# Patient Record
Sex: MALE | Race: BLACK OR AFRICAN AMERICAN | Employment: FULL TIME | URBAN - METROPOLITAN AREA
[De-identification: names, ages, dates, MRNs, and addresses within clinical notes are randomized per-mention and may not be internally consistent; named-entity substitution may affect disease eponyms.]

---

## 2020-02-03 ENCOUNTER — APPOINTMENT (OUTPATIENT)
Dept: CT IMAGING | Age: 60
End: 2020-02-03

## 2020-02-03 ENCOUNTER — ANESTHESIA EVENT (OUTPATIENT)
Dept: OPERATING ROOM | Age: 60
End: 2020-02-03

## 2020-02-03 ENCOUNTER — HOSPITAL ENCOUNTER (OUTPATIENT)
Age: 60
Setting detail: OBSERVATION
Discharge: HOME | End: 2020-02-03
Attending: EMERGENCY MEDICINE | Admitting: SURGERY

## 2020-02-03 ENCOUNTER — ANESTHESIA (OUTPATIENT)
Dept: OPERATING ROOM | Age: 60
End: 2020-02-03

## 2020-02-03 VITALS
DIASTOLIC BLOOD PRESSURE: 83 MMHG | RESPIRATION RATE: 18 BRPM | OXYGEN SATURATION: 95 % | WEIGHT: 235.6 LBS | HEIGHT: 74 IN | TEMPERATURE: 97.6 F | BODY MASS INDEX: 30.24 KG/M2 | HEART RATE: 76 BPM | SYSTOLIC BLOOD PRESSURE: 167 MMHG

## 2020-02-03 VITALS
SYSTOLIC BLOOD PRESSURE: 162 MMHG | RESPIRATION RATE: 8 BRPM | TEMPERATURE: 96.3 F | OXYGEN SATURATION: 100 % | DIASTOLIC BLOOD PRESSURE: 115 MMHG

## 2020-02-03 PROBLEM — K37 APPENDICITIS: Status: ACTIVE | Noted: 2020-02-03

## 2020-02-03 LAB
ABO/RH: NORMAL
ALBUMIN SERPL-MCNC: 3.8 G/DL (ref 3.5–5.2)
ALP BLD-CCNC: 60 U/L (ref 40–129)
ALT SERPL-CCNC: 184 U/L (ref 0–40)
ANION GAP SERPL CALCULATED.3IONS-SCNC: 11 MMOL/L (ref 7–16)
ANTIBODY SCREEN: NORMAL
AST SERPL-CCNC: 96 U/L (ref 0–39)
BASOPHILS ABSOLUTE: 0.06 E9/L (ref 0–0.2)
BASOPHILS RELATIVE PERCENT: 0.6 % (ref 0–2)
BILIRUB SERPL-MCNC: 0.5 MG/DL (ref 0–1.2)
BUN BLDV-MCNC: 10 MG/DL (ref 8–23)
CALCIUM SERPL-MCNC: 8.9 MG/DL (ref 8.6–10.2)
CHLORIDE BLD-SCNC: 102 MMOL/L (ref 98–107)
CO2: 23 MMOL/L (ref 22–29)
CREAT SERPL-MCNC: 0.8 MG/DL (ref 0.7–1.2)
EKG ATRIAL RATE: 69 BPM
EKG P AXIS: 46 DEGREES
EKG P-R INTERVAL: 168 MS
EKG Q-T INTERVAL: 406 MS
EKG QRS DURATION: 74 MS
EKG QTC CALCULATION (BAZETT): 435 MS
EKG R AXIS: 37 DEGREES
EKG T AXIS: 52 DEGREES
EKG VENTRICULAR RATE: 69 BPM
EOSINOPHILS ABSOLUTE: 0.07 E9/L (ref 0.05–0.5)
EOSINOPHILS RELATIVE PERCENT: 0.7 % (ref 0–6)
GFR AFRICAN AMERICAN: >60
GFR NON-AFRICAN AMERICAN: >60 ML/MIN/1.73
GLUCOSE BLD-MCNC: 123 MG/DL (ref 74–99)
HCT VFR BLD CALC: 37.7 % (ref 37–54)
HEMOGLOBIN: 11.5 G/DL (ref 12.5–16.5)
IMMATURE GRANULOCYTES #: 0.04 E9/L
IMMATURE GRANULOCYTES %: 0.4 % (ref 0–5)
INR BLD: 1.2
LIPASE: 57 U/L (ref 13–60)
LYMPHOCYTES ABSOLUTE: 2.45 E9/L (ref 1.5–4)
LYMPHOCYTES RELATIVE PERCENT: 23.1 % (ref 20–42)
MCH RBC QN AUTO: 24.2 PG (ref 26–35)
MCHC RBC AUTO-ENTMCNC: 30.5 % (ref 32–34.5)
MCV RBC AUTO: 79.4 FL (ref 80–99.9)
MONOCYTES ABSOLUTE: 0.77 E9/L (ref 0.1–0.95)
MONOCYTES RELATIVE PERCENT: 7.3 % (ref 2–12)
NEUTROPHILS ABSOLUTE: 7.2 E9/L (ref 1.8–7.3)
NEUTROPHILS RELATIVE PERCENT: 67.9 % (ref 43–80)
PDW BLD-RTO: 13.7 FL (ref 11.5–15)
PLATELET # BLD: 130 E9/L (ref 130–450)
PMV BLD AUTO: 13.1 FL (ref 7–12)
POTASSIUM REFLEX MAGNESIUM: 3.8 MMOL/L (ref 3.5–5)
PROTHROMBIN TIME: 13.3 SEC (ref 9.3–12.4)
RBC # BLD: 4.75 E12/L (ref 3.8–5.8)
SODIUM BLD-SCNC: 136 MMOL/L (ref 132–146)
TOTAL PROTEIN: 7.4 G/DL (ref 6.4–8.3)
TROPONIN: <0.01 NG/ML (ref 0–0.03)
WBC # BLD: 10.6 E9/L (ref 4.5–11.5)

## 2020-02-03 PROCEDURE — 88304 TISSUE EXAM BY PATHOLOGIST: CPT

## 2020-02-03 PROCEDURE — G0378 HOSPITAL OBSERVATION PER HR: HCPCS

## 2020-02-03 PROCEDURE — 93005 ELECTROCARDIOGRAM TRACING: CPT | Performed by: STUDENT IN AN ORGANIZED HEALTH CARE EDUCATION/TRAINING PROGRAM

## 2020-02-03 PROCEDURE — 2580000003 HC RX 258: Performed by: NURSE ANESTHETIST, CERTIFIED REGISTERED

## 2020-02-03 PROCEDURE — 86900 BLOOD TYPING SEROLOGIC ABO: CPT

## 2020-02-03 PROCEDURE — 6360000002 HC RX W HCPCS: Performed by: STUDENT IN AN ORGANIZED HEALTH CARE EDUCATION/TRAINING PROGRAM

## 2020-02-03 PROCEDURE — 99285 EMERGENCY DEPT VISIT HI MDM: CPT

## 2020-02-03 PROCEDURE — 6370000000 HC RX 637 (ALT 250 FOR IP): Performed by: STUDENT IN AN ORGANIZED HEALTH CARE EDUCATION/TRAINING PROGRAM

## 2020-02-03 PROCEDURE — 6360000004 HC RX CONTRAST MEDICATION: Performed by: RADIOLOGY

## 2020-02-03 PROCEDURE — 93010 ELECTROCARDIOGRAM REPORT: CPT | Performed by: INTERNAL MEDICINE

## 2020-02-03 PROCEDURE — 2580000003 HC RX 258: Performed by: STUDENT IN AN ORGANIZED HEALTH CARE EDUCATION/TRAINING PROGRAM

## 2020-02-03 PROCEDURE — 85610 PROTHROMBIN TIME: CPT

## 2020-02-03 PROCEDURE — 83690 ASSAY OF LIPASE: CPT

## 2020-02-03 PROCEDURE — 85025 COMPLETE CBC W/AUTO DIFF WBC: CPT

## 2020-02-03 PROCEDURE — 3600000003 HC SURGERY LEVEL 3 BASE: Performed by: SURGERY

## 2020-02-03 PROCEDURE — 74177 CT ABD & PELVIS W/CONTRAST: CPT

## 2020-02-03 PROCEDURE — 96367 TX/PROPH/DG ADDL SEQ IV INF: CPT

## 2020-02-03 PROCEDURE — 3700000000 HC ANESTHESIA ATTENDED CARE: Performed by: SURGERY

## 2020-02-03 PROCEDURE — 2500000003 HC RX 250 WO HCPCS: Performed by: STUDENT IN AN ORGANIZED HEALTH CARE EDUCATION/TRAINING PROGRAM

## 2020-02-03 PROCEDURE — 86901 BLOOD TYPING SEROLOGIC RH(D): CPT

## 2020-02-03 PROCEDURE — 3600000013 HC SURGERY LEVEL 3 ADDTL 15MIN: Performed by: SURGERY

## 2020-02-03 PROCEDURE — 99220 PR INITIAL OBSERVATION CARE/DAY 70 MINUTES: CPT | Performed by: SURGERY

## 2020-02-03 PROCEDURE — 7100000000 HC PACU RECOVERY - FIRST 15 MIN: Performed by: SURGERY

## 2020-02-03 PROCEDURE — 80053 COMPREHEN METABOLIC PANEL: CPT

## 2020-02-03 PROCEDURE — 3700000001 HC ADD 15 MINUTES (ANESTHESIA): Performed by: SURGERY

## 2020-02-03 PROCEDURE — 44970 LAPAROSCOPY APPENDECTOMY: CPT | Performed by: SURGERY

## 2020-02-03 PROCEDURE — 86850 RBC ANTIBODY SCREEN: CPT

## 2020-02-03 PROCEDURE — 2709999900 HC NON-CHARGEABLE SUPPLY: Performed by: SURGERY

## 2020-02-03 PROCEDURE — 84484 ASSAY OF TROPONIN QUANT: CPT

## 2020-02-03 PROCEDURE — 2500000003 HC RX 250 WO HCPCS: Performed by: SURGERY

## 2020-02-03 PROCEDURE — 96365 THER/PROPH/DIAG IV INF INIT: CPT

## 2020-02-03 PROCEDURE — 7100000001 HC PACU RECOVERY - ADDTL 15 MIN: Performed by: SURGERY

## 2020-02-03 PROCEDURE — 6360000002 HC RX W HCPCS: Performed by: EMERGENCY MEDICINE

## 2020-02-03 PROCEDURE — 2500000003 HC RX 250 WO HCPCS: Performed by: NURSE ANESTHETIST, CERTIFIED REGISTERED

## 2020-02-03 PROCEDURE — 96375 TX/PRO/DX INJ NEW DRUG ADDON: CPT

## 2020-02-03 PROCEDURE — 36415 COLL VENOUS BLD VENIPUNCTURE: CPT

## 2020-02-03 PROCEDURE — 2700000000 HC OXYGEN THERAPY PER DAY

## 2020-02-03 PROCEDURE — 6360000002 HC RX W HCPCS: Performed by: NURSE ANESTHETIST, CERTIFIED REGISTERED

## 2020-02-03 PROCEDURE — 2720000010 HC SURG SUPPLY STERILE: Performed by: SURGERY

## 2020-02-03 RX ORDER — LABETALOL HYDROCHLORIDE 5 MG/ML
10 INJECTION, SOLUTION INTRAVENOUS EVERY 10 MIN PRN
Status: DISCONTINUED | OUTPATIENT
Start: 2020-02-03 | End: 2020-02-03 | Stop reason: HOSPADM

## 2020-02-03 RX ORDER — SODIUM CHLORIDE 0.9 % (FLUSH) 0.9 %
10 SYRINGE (ML) INJECTION PRN
Status: DISCONTINUED | OUTPATIENT
Start: 2020-02-03 | End: 2020-02-03 | Stop reason: HOSPADM

## 2020-02-03 RX ORDER — ROCURONIUM BROMIDE 10 MG/ML
INJECTION, SOLUTION INTRAVENOUS PRN
Status: DISCONTINUED | OUTPATIENT
Start: 2020-02-03 | End: 2020-02-03 | Stop reason: SDUPTHER

## 2020-02-03 RX ORDER — ONDANSETRON 2 MG/ML
INJECTION INTRAMUSCULAR; INTRAVENOUS PRN
Status: DISCONTINUED | OUTPATIENT
Start: 2020-02-03 | End: 2020-02-03 | Stop reason: SDUPTHER

## 2020-02-03 RX ORDER — ONDANSETRON 2 MG/ML
4 INJECTION INTRAMUSCULAR; INTRAVENOUS ONCE
Status: COMPLETED | OUTPATIENT
Start: 2020-02-03 | End: 2020-02-03

## 2020-02-03 RX ORDER — MORPHINE SULFATE 2 MG/ML
1 INJECTION, SOLUTION INTRAMUSCULAR; INTRAVENOUS EVERY 5 MIN PRN
Status: DISCONTINUED | OUTPATIENT
Start: 2020-02-03 | End: 2020-02-03 | Stop reason: HOSPADM

## 2020-02-03 RX ORDER — NEOSTIGMINE METHYLSULFATE 1 MG/ML
INJECTION, SOLUTION INTRAVENOUS PRN
Status: DISCONTINUED | OUTPATIENT
Start: 2020-02-03 | End: 2020-02-03 | Stop reason: SDUPTHER

## 2020-02-03 RX ORDER — SODIUM CHLORIDE 9 MG/ML
INJECTION, SOLUTION INTRAVENOUS CONTINUOUS PRN
Status: DISCONTINUED | OUTPATIENT
Start: 2020-02-03 | End: 2020-02-03 | Stop reason: SDUPTHER

## 2020-02-03 RX ORDER — BUPIVACAINE HYDROCHLORIDE AND EPINEPHRINE 2.5; 5 MG/ML; UG/ML
INJECTION, SOLUTION EPIDURAL; INFILTRATION; INTRACAUDAL; PERINEURAL PRN
Status: DISCONTINUED | OUTPATIENT
Start: 2020-02-03 | End: 2020-02-03 | Stop reason: HOSPADM

## 2020-02-03 RX ORDER — FENTANYL CITRATE 50 UG/ML
INJECTION, SOLUTION INTRAMUSCULAR; INTRAVENOUS PRN
Status: DISCONTINUED | OUTPATIENT
Start: 2020-02-03 | End: 2020-02-03 | Stop reason: SDUPTHER

## 2020-02-03 RX ORDER — SODIUM CHLORIDE 0.9 % (FLUSH) 0.9 %
10 SYRINGE (ML) INJECTION EVERY 12 HOURS SCHEDULED
Status: DISCONTINUED | OUTPATIENT
Start: 2020-02-03 | End: 2020-02-03 | Stop reason: HOSPADM

## 2020-02-03 RX ORDER — PROMETHAZINE HYDROCHLORIDE 25 MG/ML
12.5 INJECTION, SOLUTION INTRAMUSCULAR; INTRAVENOUS
Status: DISCONTINUED | OUTPATIENT
Start: 2020-02-03 | End: 2020-02-03 | Stop reason: HOSPADM

## 2020-02-03 RX ORDER — OXYCODONE HYDROCHLORIDE 5 MG/1
5 TABLET ORAL EVERY 4 HOURS PRN
Status: DISCONTINUED | OUTPATIENT
Start: 2020-02-03 | End: 2020-02-03 | Stop reason: HOSPADM

## 2020-02-03 RX ORDER — DEXAMETHASONE SODIUM PHOSPHATE 10 MG/ML
INJECTION INTRAMUSCULAR; INTRAVENOUS PRN
Status: DISCONTINUED | OUTPATIENT
Start: 2020-02-03 | End: 2020-02-03 | Stop reason: SDUPTHER

## 2020-02-03 RX ORDER — GLYCOPYRROLATE 1 MG/5 ML
SYRINGE (ML) INTRAVENOUS PRN
Status: DISCONTINUED | OUTPATIENT
Start: 2020-02-03 | End: 2020-02-03 | Stop reason: SDUPTHER

## 2020-02-03 RX ORDER — OXYCODONE HYDROCHLORIDE 10 MG/1
10 TABLET ORAL EVERY 4 HOURS PRN
Status: DISCONTINUED | OUTPATIENT
Start: 2020-02-03 | End: 2020-02-03 | Stop reason: HOSPADM

## 2020-02-03 RX ORDER — LIDOCAINE HYDROCHLORIDE 10 MG/ML
INJECTION, SOLUTION INFILTRATION; PERINEURAL PRN
Status: DISCONTINUED | OUTPATIENT
Start: 2020-02-03 | End: 2020-02-03 | Stop reason: SDUPTHER

## 2020-02-03 RX ORDER — MORPHINE SULFATE 4 MG/ML
4 INJECTION, SOLUTION INTRAMUSCULAR; INTRAVENOUS ONCE
Status: COMPLETED | OUTPATIENT
Start: 2020-02-03 | End: 2020-02-03

## 2020-02-03 RX ORDER — SODIUM CHLORIDE, SODIUM LACTATE, POTASSIUM CHLORIDE, CALCIUM CHLORIDE 600; 310; 30; 20 MG/100ML; MG/100ML; MG/100ML; MG/100ML
INJECTION, SOLUTION INTRAVENOUS CONTINUOUS
Status: DISCONTINUED | OUTPATIENT
Start: 2020-02-03 | End: 2020-02-03 | Stop reason: HOSPADM

## 2020-02-03 RX ORDER — FENTANYL CITRATE 50 UG/ML
50 INJECTION, SOLUTION INTRAMUSCULAR; INTRAVENOUS ONCE
Status: COMPLETED | OUTPATIENT
Start: 2020-02-03 | End: 2020-02-03

## 2020-02-03 RX ORDER — PROPOFOL 10 MG/ML
INJECTION, EMULSION INTRAVENOUS PRN
Status: DISCONTINUED | OUTPATIENT
Start: 2020-02-03 | End: 2020-02-03 | Stop reason: SDUPTHER

## 2020-02-03 RX ORDER — 0.9 % SODIUM CHLORIDE 0.9 %
500 INTRAVENOUS SOLUTION INTRAVENOUS ONCE
Status: COMPLETED | OUTPATIENT
Start: 2020-02-03 | End: 2020-02-03

## 2020-02-03 RX ORDER — OXYCODONE HYDROCHLORIDE AND ACETAMINOPHEN 5; 325 MG/1; MG/1
1 TABLET ORAL EVERY 6 HOURS PRN
Qty: 20 TABLET | Refills: 0 | Status: SHIPPED | OUTPATIENT
Start: 2020-02-03 | End: 2020-02-08

## 2020-02-03 RX ORDER — SUCCINYLCHOLINE/SOD CL,ISO/PF 200MG/10ML
SYRINGE (ML) INTRAVENOUS PRN
Status: DISCONTINUED | OUTPATIENT
Start: 2020-02-03 | End: 2020-02-03 | Stop reason: SDUPTHER

## 2020-02-03 RX ADMIN — FENTANYL CITRATE 50 MCG: 50 INJECTION, SOLUTION INTRAMUSCULAR; INTRAVENOUS at 07:23

## 2020-02-03 RX ADMIN — SODIUM CHLORIDE: 9 INJECTION, SOLUTION INTRAVENOUS at 08:15

## 2020-02-03 RX ADMIN — FENTANYL CITRATE 50 MCG: 50 INJECTION, SOLUTION INTRAMUSCULAR; INTRAVENOUS at 04:01

## 2020-02-03 RX ADMIN — SODIUM CHLORIDE 500 ML: 9 INJECTION, SOLUTION INTRAVENOUS at 02:39

## 2020-02-03 RX ADMIN — ONDANSETRON HYDROCHLORIDE 4 MG: 2 INJECTION, SOLUTION INTRAMUSCULAR; INTRAVENOUS at 07:17

## 2020-02-03 RX ADMIN — DEXAMETHASONE SODIUM PHOSPHATE 10 MG: 10 INJECTION INTRAMUSCULAR; INTRAVENOUS at 07:17

## 2020-02-03 RX ADMIN — CEFTRIAXONE SODIUM 1 G: 1 INJECTION, POWDER, FOR SOLUTION INTRAMUSCULAR; INTRAVENOUS at 04:55

## 2020-02-03 RX ADMIN — ONDANSETRON 4 MG: 2 INJECTION INTRAMUSCULAR; INTRAVENOUS at 04:01

## 2020-02-03 RX ADMIN — FENTANYL CITRATE 100 MCG: 50 INJECTION, SOLUTION INTRAMUSCULAR; INTRAVENOUS at 07:17

## 2020-02-03 RX ADMIN — SODIUM CHLORIDE, POTASSIUM CHLORIDE, SODIUM LACTATE AND CALCIUM CHLORIDE: 600; 310; 30; 20 INJECTION, SOLUTION INTRAVENOUS at 08:58

## 2020-02-03 RX ADMIN — FENTANYL CITRATE 50 MCG: 50 INJECTION, SOLUTION INTRAMUSCULAR; INTRAVENOUS at 07:36

## 2020-02-03 RX ADMIN — Medication 3 MG: at 08:14

## 2020-02-03 RX ADMIN — OXYCODONE HYDROCHLORIDE 10 MG: 10 TABLET ORAL at 10:42

## 2020-02-03 RX ADMIN — PROPOFOL 40 MG: 10 INJECTION, EMULSION INTRAVENOUS at 07:24

## 2020-02-03 RX ADMIN — OXYCODONE HYDROCHLORIDE 10 MG: 10 TABLET ORAL at 14:35

## 2020-02-03 RX ADMIN — METRONIDAZOLE 500 MG: 500 INJECTION, SOLUTION INTRAVENOUS at 05:31

## 2020-02-03 RX ADMIN — SODIUM CHLORIDE: 9 INJECTION, SOLUTION INTRAVENOUS at 07:06

## 2020-02-03 RX ADMIN — PROPOFOL 200 MG: 10 INJECTION, EMULSION INTRAVENOUS at 07:17

## 2020-02-03 RX ADMIN — IOPAMIDOL 110 ML: 755 INJECTION, SOLUTION INTRAVENOUS at 03:32

## 2020-02-03 RX ADMIN — ROCURONIUM BROMIDE 50 MG: 10 INJECTION, SOLUTION INTRAVENOUS at 07:24

## 2020-02-03 RX ADMIN — LIDOCAINE HYDROCHLORIDE 100 MG: 10 INJECTION, SOLUTION INFILTRATION; PERINEURAL at 07:17

## 2020-02-03 RX ADMIN — Medication 0.6 MG: at 08:14

## 2020-02-03 RX ADMIN — Medication 180 MG: at 07:17

## 2020-02-03 RX ADMIN — MORPHINE SULFATE 4 MG: 4 INJECTION, SOLUTION INTRAMUSCULAR; INTRAVENOUS at 05:28

## 2020-02-03 ASSESSMENT — ENCOUNTER SYMPTOMS
EYE DISCHARGE: 0
BACK PAIN: 0
ABDOMINAL PAIN: 1
EYE PAIN: 0
SORE THROAT: 0
VOMITING: 1
COUGH: 0
DIARRHEA: 0
BLOOD IN STOOL: 0
EYE REDNESS: 0
CONSTIPATION: 0
WHEEZING: 0
SHORTNESS OF BREATH: 0
NAUSEA: 1
SINUS PRESSURE: 0

## 2020-02-03 ASSESSMENT — PAIN DESCRIPTION - ONSET
ONSET: ON-GOING
ONSET: SUDDEN

## 2020-02-03 ASSESSMENT — PAIN DESCRIPTION - PROGRESSION
CLINICAL_PROGRESSION: GRADUALLY WORSENING
CLINICAL_PROGRESSION: NOT CHANGED

## 2020-02-03 ASSESSMENT — PULMONARY FUNCTION TESTS
PIF_VALUE: 36
PIF_VALUE: 25
PIF_VALUE: 4
PIF_VALUE: 22
PIF_VALUE: 36
PIF_VALUE: 36
PIF_VALUE: 34
PIF_VALUE: 21
PIF_VALUE: 22
PIF_VALUE: 36
PIF_VALUE: 36
PIF_VALUE: 35
PIF_VALUE: 33
PIF_VALUE: 34
PIF_VALUE: 24
PIF_VALUE: 3
PIF_VALUE: 35
PIF_VALUE: 1
PIF_VALUE: 3
PIF_VALUE: 21
PIF_VALUE: 37
PIF_VALUE: 18
PIF_VALUE: 0
PIF_VALUE: 23
PIF_VALUE: 1
PIF_VALUE: 35
PIF_VALUE: 1
PIF_VALUE: 36
PIF_VALUE: 1
PIF_VALUE: 20
PIF_VALUE: 34
PIF_VALUE: 35
PIF_VALUE: 22
PIF_VALUE: 28
PIF_VALUE: 8
PIF_VALUE: 37
PIF_VALUE: 34
PIF_VALUE: 9
PIF_VALUE: 30
PIF_VALUE: 34
PIF_VALUE: 37
PIF_VALUE: 4
PIF_VALUE: 25
PIF_VALUE: 37
PIF_VALUE: 36
PIF_VALUE: 0
PIF_VALUE: 34
PIF_VALUE: 37
PIF_VALUE: 0
PIF_VALUE: 37
PIF_VALUE: 35
PIF_VALUE: 38
PIF_VALUE: 37
PIF_VALUE: 35
PIF_VALUE: 37
PIF_VALUE: 2
PIF_VALUE: 36
PIF_VALUE: 24
PIF_VALUE: 0
PIF_VALUE: 38
PIF_VALUE: 36
PIF_VALUE: 35
PIF_VALUE: 0
PIF_VALUE: 36
PIF_VALUE: 36
PIF_VALUE: 38
PIF_VALUE: 2
PIF_VALUE: 1
PIF_VALUE: 24
PIF_VALUE: 35
PIF_VALUE: 24
PIF_VALUE: 37
PIF_VALUE: 0

## 2020-02-03 ASSESSMENT — PAIN DESCRIPTION - ORIENTATION
ORIENTATION: UPPER
ORIENTATION: UPPER

## 2020-02-03 ASSESSMENT — PAIN - FUNCTIONAL ASSESSMENT: PAIN_FUNCTIONAL_ASSESSMENT: ACTIVITIES ARE NOT PREVENTED

## 2020-02-03 ASSESSMENT — PAIN SCALES - GENERAL
PAINLEVEL_OUTOF10: 0
PAINLEVEL_OUTOF10: 10
PAINLEVEL_OUTOF10: 0
PAINLEVEL_OUTOF10: 10
PAINLEVEL_OUTOF10: 7
PAINLEVEL_OUTOF10: 7
PAINLEVEL_OUTOF10: 5
PAINLEVEL_OUTOF10: 7
PAINLEVEL_OUTOF10: 8
PAINLEVEL_OUTOF10: 8

## 2020-02-03 ASSESSMENT — PAIN DESCRIPTION - LOCATION
LOCATION: ABDOMEN
LOCATION: ABDOMEN

## 2020-02-03 ASSESSMENT — PAIN DESCRIPTION - FREQUENCY
FREQUENCY: CONTINUOUS
FREQUENCY: CONTINUOUS

## 2020-02-03 ASSESSMENT — PAIN DESCRIPTION - DESCRIPTORS
DESCRIPTORS: SHARP
DESCRIPTORS: SHARP

## 2020-02-03 ASSESSMENT — PAIN DESCRIPTION - PAIN TYPE
TYPE: ACUTE PAIN
TYPE: ACUTE PAIN

## 2020-02-03 NOTE — PROGRESS NOTES
Pt is from [place], [place] and only in this area for work.  Patient is independent and will require no needs at d/c.  Sushila Tay RN

## 2020-02-03 NOTE — OP NOTE
Operative Note    Date: 2/3/2020  Time: 8:29 AM      Mark Anthony Vizcarra is an 62 y.o. male. Pre-op Diagnosis:   APPENDICITIS    Post-op Diagnosis List:  APPENDICITIS       Procedure(s):  APPENDECTOMY LAPAROSCOPIC    Surgeon:   Phuc Thomas MD, FACS    Staff:   Circulator: Issa Lopez RN  Scrub Person First: Kathrine Ambrocio  Circulator Assist: Sarmad Celis RN  Resident: Neal Ny MD, PGY-2; Pham Garnett MD, PGY-5    Anesthesia:  General    Findings: acute appendicitis     EBL: 10 cc    Drains: None    Specimens:  ID Type Source Tests Collected by Time Destination   A : APPENDIX Tissue Appendix Melecio Griffiths MD 2/3/2020 0606          Complications:  None    Disposition of Patient:  Tolerated procedure well    CDC Wound Closure Status:  Primary    Surgical Course:  Patient is a 62 y.o. male who was diagnosed with Acute Appendicitis. Risks benefits and alternatives of the procedure were discussed with the patient. The planned procedure was agreed to and informed consent was obtained. Patient was brought to the operating room and placed supine on the table. General anesthesia was induced which the patient tolerated well. Patient received appropriate  perioperative antibiotics. Patient was prepped and draped in usual sterile fashion. Time out was performed to identify correct patient and procedure. Veress needle placed suprumbilically through 5 mm port incision. Abdominal entry was achieved via optiport entry visualizing the abdominal wall layer by layer. Suprapubic and left lower quadrant incisions were made and 5 and 12 mm port placed respectively under direct visualization. No injury on entry was noted. Blunt graspers were used to sweep the bowel out of the right lower quadrant. Dissection was used to identify the appendix at the base of the cecum where the tenia converged. It appeared acute inflamed with surrounding edema.   The appendix was attached to the lateral wall with adhesions which were taken down with the Enseal device. The the appendix was identified and at this time, and followed back to the base of the appendix which was grasped and elevated. A mesenteric window was made. Blue load stapler Endo TONY 45mm was placed across the base of the appendix and fired. Mesentery was ligated with Enseal. Hemostasis was ensured and staple line appeared intact. LLQ fascia closed with absorbable suture with yovany raymond device. Port sites closed with subcuticular absorbable suture. Needle, instrument, sponge count were correct x2. Dr. Sheron Gallardo was present for the entire case. patient tolerated the procedure well and transferred to PACU in stable condition.     Fátima Allen MD  02/03/20  8:29 AM

## 2020-02-03 NOTE — H&P
GENERAL SURGERY  HISTORY AND PHYSICAL             Date: 2/3/2020        Patient Name: Dameon Pruett     YOB: 1960      Age:  62 y.o. Chief Complaint     Chief Complaint   Patient presents with    Abdominal Pain     Upper abd pain throughout the day, but worse after eating at 4 pm.  Pt took Pepto and vomited twice      History Obtained From   patient    History of Present Illness   Dameon Pruett is a 62 y.o. male with hepatitis C who presents for nausea, vomiting, and cramping intermittent abdominal pain starting yesterday after returning home from work. It is all across his mid abdomen and has not changed in location nor severity. Never had anything like this before. Emesis with food, beer, pepto bismol. Never had symptoms like this before. Denies history of GERD or ulcers. Never had EGD/cscope before. Had a normal BM yesterday, no melena/blood, felt bloated/constipated. Past Medical History     Past Medical History:   Diagnosis Date    Hepatitis C         Past Surgical History   History reviewed. No pertinent surgical history. Medications Prior to Admission     Prior to Admission medications    Medication Sig Start Date End Date Taking? Authorizing Provider   clobetasol (TEMOVATE) 0.05 % ointment Apply topically 2 times daily. 7/19/13   Barry James MD        Allergies   Patient has no known allergies. Social History     Social History     Tobacco History     Smoking Status  Current Every Day Smoker Smoking Frequency  0.2 packs/day    Smokeless Tobacco Use  Never Used          Alcohol History     Alcohol Use Status  Yes          Drug Use     Drug Use Status  No          Sexual Activity     Sexually Active  Not Asked            ~3 beers per day, 2 cigarettes per day. Family History   History reviewed. No pertinent family history.     Review of Systems   Pertinent ROS listed in HPI, all others negative    Physical Exam   BP (!) 144/83   Pulse 71   Temp 96.6 °F (35.9 °C) (Temporal)   Resp 18   Ht 6' 2\" (1.88 m)   Wt 245 lb (111.1 kg)   SpO2 99%   BMI 31.46 kg/m²     GENERAL:  No acute distress. Alert and oriented. HEAD:  Normocephalic, atraumatic. EYES:  No scleral icterus. Conjugate gaze. ENT/NECK:  Trachea midline, mucous membranes moist.   LUNGS:  No cough. Nonlabored breathing on room air. CARDIOVASC:  Normal rate, no cyanosis, regular rhythm. ABDOMEN:  Soft, non-distended, focally-tender in RLQ with mild guarding. No other guarding / rigidity / rebound. LIMBS:  No deformities, no edema. NEURO:  Face symmetric, moves all extremities  SKIN:  Warm, dry. Labs    CBC  Recent Labs     02/03/20 0217   WBC 10.6   HGB 11.5*   HCT 37.7        BMP  Recent Labs     02/03/20 0217      K 3.8      CO2 23   BUN 10   CREATININE 0.8   CALCIUM 8.9     Liver Function  Recent Labs     02/03/20 0217   LIPASE 57   BILITOT 0.5   AST 96*   *   ALKPHOS 60   PROT 7.4   LABALBU 3.8     No results for input(s): LACTATE in the last 72 hours. No results for input(s): INR, PTT in the last 72 hours. Invalid input(s): PT    Imaging/Diagnostics Last 24 Hours   Ct Abdomen Pelvis W Iv Contrast Additional Contrast? None    Addendum Date: 2/3/2020    **THIS REPORT CONTAINS FINDINGS WHICH MAY BE CRITICAL TO PATIENT CARE. ** -Using a conventional landline telephone device, the critical findings of this report were read aloud by  to Dr. Darcy Ayala at 4:38 AM EST on 2/3/2020, who then verbally acknowledged the findings described within report.  This addendum has been electronically signed by Bear Lambert MD.    Result Date: 2/3/2020  PROCEDURE INFORMATION: Exam: CT Abdomen And Pelvis With Contrast Exam date and time: 2/3/2020 2:30 AM Age: 62years old Clinical indication: Abdominal pain; Generalized TECHNIQUE: Imaging protocol: Computed tomography of the abdomen and pelvis with intravenous contrast. Radiation optimization: All CT scans at this facility use at male with appendicitis    Plan   - laparoscopic possible open appendectomy    Plan will be discussed with Dr. Demetra Rutledge    Electronically signed by Susie Trinidad MD on 2/3/20 at 5:13 AM

## 2020-02-03 NOTE — ANESTHESIA POSTPROCEDURE EVALUATION
Department of Anesthesiology  Postprocedure Note    Patient: Magalie Stephens  MRN: [id number]  YOB: 1960  Date of evaluation: 2/3/2020  Time:  8:59 AM     Procedure Summary     Date:  02/03/20 Room / Location:  [facility] OR 12 / [facility]    Anesthesia Start:  1590 Anesthesia Stop:  8794    Procedure:  APPENDECTOMY LAPAROSCOPIC (N/A Abdomen) Diagnosis:  (APPENDICITIS)    Surgeon:  Shahriar Wasserman MD Responsible Provider:  Dong Langford MD    Anesthesia Type:  general ASA Status:  3          Anesthesia Type: general    Radha Phase I: Radha Score: 8    Radha Phase II:      Last vitals: Reviewed and per EMR flowsheets.        Anesthesia Post Evaluation    Patient location during evaluation: PACU  Patient participation: complete - patient participated  Level of consciousness: awake  Pain score: 0  Airway patency: patent  Nausea & Vomiting: no nausea and no vomiting  Complications: no  Cardiovascular status: hemodynamically stable  Respiratory status: acceptable  Hydration status: euvolemic

## 2020-02-03 NOTE — ANESTHESIA PRE PROCEDURE
debris/appendicoliths    noted. Intraperitoneal space: No pneumoperitoneum. No free intraperitoneal fluid. Vasculature: No aortic aneurysm. Atherosclerotic arterial calcifications    present. Right pelvic phlebolith. Lymph nodes: Shotty nonspecific retroperitoneal and bilateral inguinal lymph    nodes present. Bladder: Unremarkable distended urinary bladder. Reproductive: Unremarkable as visualized. Bones/joints: No acute displaced fracture or osseous neoplastic lesion. Mild    multilevel degenerative changes present within the spine. Soft tissues: Unremarkable.            Impression   1. Acute appendicitis. 2. Remainder of findings described as above.        This report has been electronically signed by Qamar Coello MD.             Anesthesia Evaluation  Patient summary reviewed and Nursing notes reviewed no history of anesthetic complications:   Airway: Mallampati: IV  TM distance: >3 FB   Neck ROM: full  Mouth opening: > = 3 FB Dental:          Pulmonary:Negative Pulmonary ROS and normal exam                               Cardiovascular:  Exercise tolerance: good (>4 METS),           Rhythm: regular  Rate: normal                    Neuro/Psych:   Negative Neuro/Psych ROS              GI/Hepatic/Renal:   (+) hepatitis: C, liver disease:,          ROS comment: Nausea/vomiting . Endo/Other: Negative Endo/Other ROS                    Abdominal:           Vascular: negative vascular ROS. Anesthesia Plan      general     ASA 3     (#18 Left AC )  Induction: rapid sequence and intravenous. MIPS: Postoperative opioids intended, Prophylactic antiemetics administered and Postoperative trial extubation. Anesthetic plan and risks discussed with patient. Use of blood products discussed with patient whom consented to blood products. Plan discussed with attending.                 Luna Pantoja RN   2/3/2020

## 2020-02-03 NOTE — ED PROVIDER NOTES
Chief Complaint   Patient presents with    Abdominal Pain     Upper abd pain throughout the day, but worse after eating at 4 pm.  Pt took Pepto and vomited twice       Amy Dawkins is a 26-year-old male with past medical history of hepatitis C, who presents today with abdominal pain. Patient states symptoms started around 5:00 today is generalized abdominal pain, and is continued to persist throughout the evening. Patient states he tried Pepto-Bismol at home, with minimal relief. He states he had nausea with multiple episodes of vomiting. He states he had not had pain like this before in the past.  Pain is worse with movement, and improved with rest.  Patient denies previous abdominal surgeries in the past.  Denies changes in stools, had a normal bowel movement this morning. Review of Systems   Constitutional: Negative for chills and fever. HENT: Negative for ear pain, sinus pressure and sore throat. Eyes: Negative for pain, discharge and redness. Respiratory: Negative for cough, shortness of breath and wheezing. Cardiovascular: Negative for chest pain, palpitations and leg swelling. Gastrointestinal: Positive for abdominal pain, nausea and vomiting. Negative for blood in stool, constipation and diarrhea. Genitourinary: Negative for dysuria and frequency. Musculoskeletal: Negative for arthralgias and back pain. Skin: Negative for rash and wound. Neurological: Negative for weakness and headaches. Hematological: Negative for adenopathy. Psychiatric/Behavioral: Negative for behavioral problems and confusion. All other systems reviewed and are negative. Physical Exam  Vitals signs and nursing note reviewed. Constitutional:       Appearance: Normal appearance. He is well-developed. He is obese. He is not toxic-appearing or diaphoretic. HENT:      Head: Normocephalic and atraumatic.       Nose: Nose normal.      Mouth/Throat:      Mouth: Mucous membranes are moist.   Eyes: Pupils: Pupils are equal, round, and reactive to light. Neck:      Musculoskeletal: Normal range of motion and neck supple. Cardiovascular:      Rate and Rhythm: Normal rate and regular rhythm. Pulses: Normal pulses. Heart sounds: Normal heart sounds. No murmur. Pulmonary:      Effort: Pulmonary effort is normal. No respiratory distress. Breath sounds: Normal breath sounds. No wheezing or rales. Abdominal:      General: Bowel sounds are normal. There is no distension. Palpations: Abdomen is soft. Tenderness: There is abdominal tenderness. There is no right CVA tenderness, left CVA tenderness, guarding or rebound. Comments: Generalized abdominal tenderness, mildly worse in the right lower quadrant with palpation   Musculoskeletal: Normal range of motion. Right lower leg: No edema. Left lower leg: No edema. Skin:     General: Skin is warm and dry. Capillary Refill: Capillary refill takes less than 2 seconds. Findings: No erythema, lesion or rash. Neurological:      General: No focal deficit present. Mental Status: He is alert and oriented to person, place, and time. Cranial Nerves: No cranial nerve deficit.       Coordination: Coordination normal.   Psychiatric:         Mood and Affect: Mood normal.         Behavior: Behavior normal.          Procedures     Labs Reviewed   CBC WITH AUTO DIFFERENTIAL - Abnormal; Notable for the following components:       Result Value    Hemoglobin 11.5 (*)     MCV 79.4 (*)     MCH 24.2 (*)     MCHC 30.5 (*)     MPV 13.1 (*)     All other components within normal limits   COMPREHENSIVE METABOLIC PANEL W/ REFLEX TO MG FOR LOW K - Abnormal; Notable for the following components:    Glucose 123 (*)      (*)     AST 96 (*)     All other components within normal limits   LIPASE   TROPONIN     CT ABDOMEN PELVIS W IV CONTRAST Additional Contrast? None   Final Result   Addendum 1 of 1   **THIS REPORT CONTAINS FINDINGS allergies.     -------------------------------------------------- RESULTS -------------------------------------------------    LABS:  Results for orders placed or performed during the hospital encounter of 02/03/20   CBC Auto Differential   Result Value Ref Range    WBC 10.6 4.5 - 11.5 E9/L    RBC 4.75 3.80 - 5.80 E12/L    Hemoglobin 11.5 (L) 12.5 - 16.5 g/dL    Hematocrit 37.7 37.0 - 54.0 %    MCV 79.4 (L) 80.0 - 99.9 fL    MCH 24.2 (L) 26.0 - 35.0 pg    MCHC 30.5 (L) 32.0 - 34.5 %    RDW 13.7 11.5 - 15.0 fL    Platelets 004 730 - 756 E9/L    MPV 13.1 (H) 7.0 - 12.0 fL    Neutrophils % 67.9 43.0 - 80.0 %    Immature Granulocytes % 0.4 0.0 - 5.0 %    Lymphocytes % 23.1 20.0 - 42.0 %    Monocytes % 7.3 2.0 - 12.0 %    Eosinophils % 0.7 0.0 - 6.0 %    Basophils % 0.6 0.0 - 2.0 %    Neutrophils Absolute 7.20 1.80 - 7.30 E9/L    Immature Granulocytes # 0.04 E9/L    Lymphocytes Absolute 2.45 1.50 - 4.00 E9/L    Monocytes Absolute 0.77 0.10 - 0.95 E9/L    Eosinophils Absolute 0.07 0.05 - 0.50 E9/L    Basophils Absolute 0.06 0.00 - 0.20 E9/L   Comprehensive Metabolic Panel w/ Reflex to MG   Result Value Ref Range    Sodium 136 132 - 146 mmol/L    Potassium reflex Magnesium 3.8 3.5 - 5.0 mmol/L    Chloride 102 98 - 107 mmol/L    CO2 23 22 - 29 mmol/L    Anion Gap 11 7 - 16 mmol/L    Glucose 123 (H) 74 - 99 mg/dL    BUN 10 8 - 23 mg/dL    CREATININE 0.8 0.7 - 1.2 mg/dL    GFR Non-African American >60 >=60 mL/min/1.73    GFR African American >60     Calcium 8.9 8.6 - 10.2 mg/dL    Total Protein 7.4 6.4 - 8.3 g/dL    Alb 3.8 3.5 - 5.2 g/dL    Total Bilirubin 0.5 0.0 - 1.2 mg/dL    Alkaline Phosphatase 60 40 - 129 U/L     (H) 0 - 40 U/L    AST 96 (H) 0 - 39 U/L   Lipase   Result Value Ref Range    Lipase 57 13 - 60 U/L   Troponin   Result Value Ref Range    Troponin <0.01 0.00 - 0.03 ng/mL       RADIOLOGY:  CT ABDOMEN PELVIS W IV CONTRAST Additional Contrast? None   Final Result   Addendum 1 of 1   **THIS REPORT CONTAINS Acute appendicitis with generalized peritonitis, without gangrene or abscess, unspecified whether perforation present        Disposition:  Patient's disposition: Admit to med/surg floor  Patient's condition is fair.             Anoop Ngo DO  Resident  02/03/20 0341

## 2020-02-03 NOTE — BRIEF OP NOTE
Brief Postoperative Note  ______________________________________________________________    Patient: Anastasia Camp  YOB: 1960  MRN: [id number]  Date of Procedure: 2/3/2020    Pre-Op Diagnosis: APPENDICITIS    Post-Op Diagnosis: Same       Procedure(s):  APPENDECTOMY LAPAROSCOPIC    Anesthesia: General    Surgeon(s):  MD Jacob Zavaleta MD    Assistant: Ubaldo Leon MD, PGY-5  Nabor Rincon MD, PhD, PGY-2    Estimated Blood Loss (mL): 10 cc    Complications: None    Specimens:   ID Type Source Tests Collected by Time Destination   A : APPENDIX Tissue Appendix SURGICAL PATHOLOGY Jessica Guerrero MD 2/3/2020 3104        Implants:  * No implants in log *      Drains:   Urethral Catheter Straight-tip 16 fr (Active)       Findings: acute appendicitis     Nabor Rincon MD  Date: 2/3/2020  Time: 8:27 AM

## (undated) DEVICE — SYRINGE 20ML LL S/C 50

## (undated) DEVICE — INSUFFLATION TUBING SET WITH FILTER, FUNNEL CONNECTOR AND LUER LOCK: Brand: JOSNOE MEDICAL INC

## (undated) DEVICE — TROCAR: Brand: KII FIOS FIRST ENTRY

## (undated) DEVICE — RELOAD STPL SZ 0 L45MM DIA3.5MM 0DEG STD REG TISS BLU TI

## (undated) DEVICE — PATIENT RETURN ELECTRODE, SINGLE-USE, CONTACT QUALITY MONITORING, ADULT, WITH 9FT CORD, FOR PATIENTS WEIGING OVER 33LBS. (15KG): Brand: MEGADYNE

## (undated) DEVICE — SURGICAL PROCEDURE PACK BASIC

## (undated) DEVICE — NEEDLE HYPO 25GA L1.5IN BLU POLYPR HUB S STL REG BVL STR

## (undated) DEVICE — CUTTER ENDOSCP L340MM LIN ARTC SGL STROKE FIRING ENDOPATH

## (undated) DEVICE — GOWN,SIRUS,FABRNF,XL,20/CS: Brand: MEDLINE

## (undated) DEVICE — NEEDLE CLOSURE OMNICLOSE

## (undated) DEVICE — STERILE POLYISOPRENE POWDER-FREE SURGICAL GLOVES: Brand: PROTEXIS

## (undated) DEVICE — LAPAROSCOPIC SCISSORS: Brand: EPIX LAPAROSCOPIC SCISSORS

## (undated) DEVICE — CHLORAPREP 26ML ORANGE

## (undated) DEVICE — GARMENT,MEDLINE,DVT,INT,CALF,MED, GEN2: Brand: MEDLINE

## (undated) DEVICE — INSUFFLATION NEEDLE TO ESTABLISH PNEUMOPERITONEUM.: Brand: INSUFFLATION NEEDLE

## (undated) DEVICE — Z DISCONTINUED NO SUB IDED BAG SPEC RETRV M C240ML MOUTH 7.3MM L17CM SHFT 10MM NYL EZEE

## (undated) DEVICE — INTENDED FOR TISSUE SEPARATION, AND OTHER PROCEDURES THAT REQUIRE A SHARP SURGICAL BLADE TO PUNCTURE OR CUT.: Brand: BARD-PARKER ® STAINLESS STEEL BLADES

## (undated) DEVICE — SOLUTION IV IRRIG POUR BRL 0.9% SODIUM CHL 2F7124

## (undated) DEVICE — SET ENDO INSTR LAPAROSCOPIC STGENLAP

## (undated) DEVICE — KIT,ANTI FOG,W/SPONGE & FLUID,SOFT PACK: Brand: MEDLINE

## (undated) DEVICE — TROCAR: Brand: KII® SLEEVE

## (undated) DEVICE — TOWEL,OR,DSP,ST,BLUE,STD,6/PK,12PK/CS: Brand: MEDLINE

## (undated) DEVICE — SEALER TISS L45CM ADV BPLR STR TIP LAP APPRCH ENSEAL G2

## (undated) DEVICE — CAMERA STRYKER 1488 HD GEN

## (undated) DEVICE — TIBURON GENERAL ENDOSCOPY DRAPE: Brand: CONVERTORS

## (undated) DEVICE — SYRINGE MED 10ML LUERLOCK TIP W/O SFTY DISP

## (undated) DEVICE — Z INACTIVE USE 2660664 SOLUTION IRRIG 3000ML 0.9% SOD CHL USP UROMATIC PLAS CONT

## (undated) DEVICE — TOTAL TRAY, DB, 100% SILI FOLEY, 16FR 10: Brand: MEDLINE

## (undated) DEVICE — RELOAD STPL H1-2.5X45MM VASC THN TISS WHT 6 ROW B FRM SGL

## (undated) DEVICE — Z DUP USE 2257490 ADHESIVE SKIN CLSRE 036ML TPCL 2CTL CNCRLTE HIGH VSCSTY DRMB

## (undated) DEVICE — SOLUTION IV IRRIG WATER 1000ML POUR BRL 2F7114

## (undated) DEVICE — SET INSTRUMENT LAP I